# Patient Record
Sex: FEMALE | Race: BLACK OR AFRICAN AMERICAN | NOT HISPANIC OR LATINO | URBAN - METROPOLITAN AREA
[De-identification: names, ages, dates, MRNs, and addresses within clinical notes are randomized per-mention and may not be internally consistent; named-entity substitution may affect disease eponyms.]

---

## 2020-07-01 ENCOUNTER — OFFICE VISIT (OUTPATIENT)
Dept: URBAN - METROPOLITAN AREA CLINIC 92 | Facility: CLINIC | Age: 70
End: 2020-07-01
Payer: MEDICARE

## 2020-07-01 ENCOUNTER — LAB OUTSIDE AN ENCOUNTER (OUTPATIENT)
Dept: URBAN - METROPOLITAN AREA CLINIC 92 | Facility: CLINIC | Age: 70
End: 2020-07-01

## 2020-07-01 DIAGNOSIS — Z86.010 HISTORY OF COLON POLYPS: ICD-10-CM

## 2020-07-01 DIAGNOSIS — B19.20 HCV (HEPATITIS C VIRUS): ICD-10-CM

## 2020-07-01 PROCEDURE — G8938 BMI DOC ONL FUP NT DOC: HCPCS | Performed by: INTERNAL MEDICINE

## 2020-07-01 PROCEDURE — 99214 OFFICE O/P EST MOD 30 MIN: CPT | Performed by: INTERNAL MEDICINE

## 2020-07-01 PROCEDURE — G8427 DOCREV CUR MEDS BY ELIG CLIN: HCPCS | Performed by: INTERNAL MEDICINE

## 2020-07-01 PROCEDURE — 3017F COLORECTAL CA SCREEN DOC REV: CPT | Performed by: INTERNAL MEDICINE

## 2020-07-01 RX ORDER — SODIUM, POTASSIUM,MAG SULFATES 17.5-3.13G
17.5-13.3-1.6 GM/177ML SOLUTION, RECONSTITUTED, ORAL ORAL
Qty: 354 MILLILITER | Refills: 0 | OUTPATIENT
Start: 2020-07-01 | End: 2020-07-02

## 2020-07-01 NOTE — HPI-OTHER HISTORIES
This is a 70-year-old female who presents for evaluation of abdominal pain  She was initially seen for the treatment of chronic HCV.  The patient has a history of IVDA with heroin from 1992 to 2004.  Labs on 05/23/2018 demonstrated elevated liver tests with , , AlkP 134, and TB 0.5.  Her HCV genotype was 1A with HCV RNA of 2 million.  She did not follow up to discuss treatment.  There is a family history of a sister who had colorectal malignancy at age 70 and she has a personal history of colon polyps.  Screening colonoscopy on 06/21/2018 revealed four adenomas and hyperplastic polyps.  The largest was a 1.0 cm tubulovillous adenoma with high-grade dysplasia in the cecum.  There was diverticulosis in the sigmoid colon internal hemorrhoids.  She was recommended to undergo repeat procedure in 6 months but failed to do so.  She states that she became homeless after her initial visit and could not follow up.  She had been drinking up to 2 peers a day but recently quit 1 week ago.  The patient is asymptomatic and denies any abdominal pain, N/V, changes in bowel habits.  There are no symptoms of hematemesis, melena, hematochezia, or constitutional symptoms including unintentional weight loss.  There is no family history of colonic adenoma or colorectal malignancy.

## 2020-07-02 LAB
A/G RATIO: 0.9
ALBUMIN: 4
ALKALINE PHOSPHATASE: 164
ALT (SGPT): 186
AST (SGOT): 165
BILIRUBIN, TOTAL: 0.3
BUN/CREATININE RATIO: 11
BUN: 10
CALCIUM: 9.5
CARBON DIOXIDE, TOTAL: 21
CHLORIDE: 102
CREATININE: 0.89
EGFR IF AFRICN AM: 75
EGFR IF NONAFRICN AM: 65
GLOBULIN, TOTAL: 4.3
GLUCOSE: 98
HEMATOCRIT: 39.2
HEMOGLOBIN: 13.3
INR: 1.1
MCH: 33.9
MCHC: 33.9
MCV: 100
NRBC: (no result)
PLATELETS: 130
POTASSIUM: 4.1
PROTEIN, TOTAL: 8.3
PROTHROMBIN TIME: 11.1
RBC: 3.92
RDW: 11.8
SODIUM: 138
WBC: 5

## 2020-07-06 ENCOUNTER — TELEPHONE ENCOUNTER (OUTPATIENT)
Dept: URBAN - METROPOLITAN AREA CLINIC 92 | Facility: CLINIC | Age: 70
End: 2020-07-06

## 2020-07-24 ENCOUNTER — LAB OUTSIDE AN ENCOUNTER (OUTPATIENT)
Dept: URBAN - METROPOLITAN AREA CLINIC 92 | Facility: CLINIC | Age: 70
End: 2020-07-24

## 2020-07-24 ENCOUNTER — OFFICE VISIT (OUTPATIENT)
Dept: URBAN - METROPOLITAN AREA CLINIC 92 | Facility: CLINIC | Age: 70
End: 2020-07-24

## 2020-07-28 ENCOUNTER — OFFICE VISIT (OUTPATIENT)
Dept: URBAN - METROPOLITAN AREA SURGERY CENTER 16 | Facility: SURGERY CENTER | Age: 70
End: 2020-07-28
Payer: MEDICARE

## 2020-07-28 ENCOUNTER — CLAIMS CREATED FROM THE CLAIM WINDOW (OUTPATIENT)
Dept: URBAN - METROPOLITAN AREA CLINIC 4 | Facility: CLINIC | Age: 70
End: 2020-07-28
Payer: MEDICARE

## 2020-07-28 DIAGNOSIS — D12.5 BENIGN NEOPLASM OF SIGMOID COLON: ICD-10-CM

## 2020-07-28 DIAGNOSIS — D12.2 ADENOMATOUS POLYP OF ASCENDING COLON: ICD-10-CM

## 2020-07-28 DIAGNOSIS — D12.2 BENIGN NEOPLASM OF ASCENDING COLON: ICD-10-CM

## 2020-07-28 DIAGNOSIS — K63.89 OTHER SPECIFIED DISEASES OF INTESTINE: ICD-10-CM

## 2020-07-28 DIAGNOSIS — K63.5 BENIGN COLON POLYP: ICD-10-CM

## 2020-07-28 DIAGNOSIS — Z86.010 H/O ADENOMATOUS POLYP OF COLON: ICD-10-CM

## 2020-07-28 DIAGNOSIS — D12.4 ADENOMATOUS POLYP OF DESCENDING COLON: ICD-10-CM

## 2020-07-28 DIAGNOSIS — K63.89 BACTERIAL OVERGROWTH SYNDROME: ICD-10-CM

## 2020-07-28 DIAGNOSIS — D12.4 BENIGN NEOPLASM OF DESCENDING COLON: ICD-10-CM

## 2020-07-28 PROCEDURE — 88305 TISSUE EXAM BY PATHOLOGIST: CPT | Performed by: PATHOLOGY

## 2020-07-28 PROCEDURE — G8907 PT DOC NO EVENTS ON DISCHARG: HCPCS | Performed by: INTERNAL MEDICINE

## 2020-07-28 PROCEDURE — 45380 COLONOSCOPY AND BIOPSY: CPT | Performed by: INTERNAL MEDICINE

## 2020-07-28 PROCEDURE — 45385 COLONOSCOPY W/LESION REMOVAL: CPT | Performed by: INTERNAL MEDICINE

## 2020-07-28 PROCEDURE — G9936 PMH PLYP/NEO CO/RECT/JUN/ANS: HCPCS | Performed by: INTERNAL MEDICINE

## 2020-08-01 LAB
HCV LOG10: (no result)
HCV LOG10: 7.01
HCV RNA (INTERNATIONAL UNITS): (no result)
HEPATITIS C QUANTITATION: (no result)
TEST INFORMATION:: (no result)

## 2020-08-19 ENCOUNTER — OFFICE VISIT (OUTPATIENT)
Dept: URBAN - METROPOLITAN AREA CLINIC 92 | Facility: CLINIC | Age: 70
End: 2020-08-19

## 2020-08-19 NOTE — HPI-TODAY'S VISIT:
This is a 70-year-old female who presents for evaluation of abdominal pain  She was initially seen for the treatment of chronic HCV.  The patient has a history of IVDA with heroin from 1992 to 2004.  Labs on 05/23/2018 demonstrated elevated liver tests with , , AlkP 134, and TB 0.5.  Her HCV genotype was 1A with HCV RNA of 2 million.  She did not follow up to discuss treatment.  Labs on 07/02/2020 showed elevated liver tests with , , AlkP 164, and TB 0.3.  CBC was normal except thrombocytopenia with Plt 130. INR is 1.1.  HCV RNA was 10,298,200. Abdominal ultrasound on 07/24/2020 showed normal echogenicity with mild hepatomegaly.  There are scattered simple cysts.  There is dilatation of the common bile duct measuring 7 mm without intrahepatic biliary ductal dilatation.    There is a family history of a sister who had colorectal malignancy at age 70 and she has a personal history of colon polyps.  Screening colonoscopy on 06/21/2018 revealed four adenomas and hyperplastic polyps.  The largest was a 1.0 cm tubulovillous adenoma with high-grade dysplasia in the cecum.  There was diverticulosis in the sigmoid colon internal hemorrhoids.  She was recommended to undergo repeat procedure in 6 months but failed to do so.  Colonoscopy on 07/20/2020 showed diverticulosis in the sigmoid colon and internal hemorrhoids.  There were four tubular adenomas, benign mucosal polyp, and hyperplastic polyp.  She states that she became homeless after her initial visit and could not follow up.  She had been drinking up to 2 beers a day but recently quit 1 week ago.  The patient is asymptomatic and denies any abdominal pain, N/V, changes in bowel habits.  There are no symptoms of hematemesis, melena, hematochezia, or constitutional symptoms including unintentional weight loss.  There is no family history of colonic adenoma or colorectal malignancy.

## 2020-09-09 ENCOUNTER — OFFICE VISIT (OUTPATIENT)
Dept: URBAN - METROPOLITAN AREA CLINIC 92 | Facility: CLINIC | Age: 70
End: 2020-09-09

## 2020-09-11 ENCOUNTER — OFFICE VISIT (OUTPATIENT)
Dept: URBAN - METROPOLITAN AREA TELEHEALTH 2 | Facility: TELEHEALTH | Age: 70
End: 2020-09-11

## 2020-10-14 ENCOUNTER — OFFICE VISIT (OUTPATIENT)
Dept: URBAN - METROPOLITAN AREA CLINIC 92 | Facility: CLINIC | Age: 70
End: 2020-10-14
Payer: MEDICARE

## 2020-10-14 VITALS
HEART RATE: 67 BPM | SYSTOLIC BLOOD PRESSURE: 122 MMHG | BODY MASS INDEX: 21.58 KG/M2 | WEIGHT: 126.4 LBS | TEMPERATURE: 94.4 F | HEIGHT: 64 IN | DIASTOLIC BLOOD PRESSURE: 81 MMHG

## 2020-10-14 DIAGNOSIS — Z86.010 HISTORY OF COLON POLYPS: ICD-10-CM

## 2020-10-14 DIAGNOSIS — Z80.0 FAMILY HISTORY OF COLON CANCER: ICD-10-CM

## 2020-10-14 DIAGNOSIS — K57.90 DIVERTICULOSIS: ICD-10-CM

## 2020-10-14 DIAGNOSIS — B17.10 HEPATITIS C: ICD-10-CM

## 2020-10-14 PROBLEM — 312824007: Status: ACTIVE | Noted: 2020-10-14

## 2020-10-14 PROCEDURE — G8482 FLU IMMUNIZE ORDER/ADMIN: HCPCS | Performed by: PHYSICIAN ASSISTANT

## 2020-10-14 PROCEDURE — G8427 DOCREV CUR MEDS BY ELIG CLIN: HCPCS | Performed by: PHYSICIAN ASSISTANT

## 2020-10-14 PROCEDURE — G8420 CALC BMI NORM PARAMETERS: HCPCS | Performed by: PHYSICIAN ASSISTANT

## 2020-10-14 PROCEDURE — 99214 OFFICE O/P EST MOD 30 MIN: CPT | Performed by: PHYSICIAN ASSISTANT

## 2020-10-14 PROCEDURE — 3017F COLORECTAL CA SCREEN DOC REV: CPT | Performed by: PHYSICIAN ASSISTANT

## 2020-10-14 PROCEDURE — G9903 PT SCRN TBCO ID AS NON USER: HCPCS | Performed by: PHYSICIAN ASSISTANT

## 2020-10-14 RX ORDER — GLECAPREVIR AND PIBRENTASVIR 40; 100 MG/1; MG/1
3 TABLETS TABLET, FILM COATED ORAL ONCE A DAY
Qty: 90 TABLET | Refills: 1 | OUTPATIENT
Start: 2020-10-14 | End: 2020-12-12

## 2020-10-14 NOTE — HPI-TODAY'S VISIT:
This is a 70-year-old female who presents for follow-up after a colonoscopy.    She was initially seen for the treatment of chronic HCV.  The patient has a history of IVDA with heroin from 1992 to 2004.  Labs on 05/23/2018 demonstrated elevated liver tests with , , AlkP 134, and TB 0.5.  Her HCV genotype was 1A with HCV RNA of 2 million.  She did not follow up to discuss treatment.   Labs on 07/02/2020 showed elevated liver tests with , , AlkP 164, and TB 0.3.  CBC was normal except thrombocytopenia with Plt 130. INR is 1.1.  HCV RNA was 10,298,200. Abdominal ultrasound on 07/24/2020 showed normal echogenicity with mild hepatomegaly.  There are scattered simple cysts.  There is dilatation of the common bile duct measuring 7 mm without intrahepatic biliary ductal dilatation.    There is a family history of a sister who had colorectal malignancy at age 70 and she has a personal history of colon polyps.  Screening colonoscopy on 06/21/2018 revealed four adenomas and hyperplastic polyps.  The largest was a 1.0 cm tubulovillous adenoma with high-grade dysplasia in the cecum.  There was diverticulosis in the sigmoid colon internal hemorrhoids.  She was recommended to undergo repeat procedure in 6 months but failed to do so.  Colonoscopy on 07/20/2020 showed diverticulosis in the sigmoid colon and internal hemorrhoids.  There were four tubular adenomas, benign mucosal polyp, and hyperplastic polyp.  Colonoscopy 7/28/2020 2mm benign cecal polyp, 3 small adenomas in ascending colon, 3mm hyperplastic rectosigmoid polyp, 5mm descending colon adenoma  She states that she became homeless after her initial visit and could not follow up.  She had been drinking up to 2 beers a day but recently quit 3months ago ago.  The patient is asymptomatic and denies any abdominal pain, N/V, changes in bowel habits.  There are no symptoms of hematemesis, melena, hematochezia, or constitutional symptoms including unintentional weight loss.  There is no family history of colonic adenoma or colorectal malignancy.  She wants to start therapy for HCV

## 2020-11-12 ENCOUNTER — OFFICE VISIT (OUTPATIENT)
Dept: URBAN - METROPOLITAN AREA CLINIC 92 | Facility: CLINIC | Age: 70
End: 2020-11-12

## 2020-11-12 RX ORDER — GLECAPREVIR AND PIBRENTASVIR 40; 100 MG/1; MG/1
3 TABLETS TABLET, FILM COATED ORAL ONCE A DAY
Qty: 90 TABLET | Refills: 1 | OUTPATIENT

## 2020-11-12 RX ORDER — GLECAPREVIR AND PIBRENTASVIR 40; 100 MG/1; MG/1
3 TABLETS TABLET, FILM COATED ORAL ONCE A DAY
Qty: 90 TABLET | Refills: 1 | COMMUNITY
Start: 2020-10-14 | End: 2020-12-12

## 2020-11-12 NOTE — HPI-TODAY'S VISIT:
This is a 70-year-old female who presents for follow-up after a colonoscopy.     She was initially seen for the treatment of chronic HCV.  The patient has a history of IVDA with heroin from 1992 to 2004.  Labs on 05/23/2018 demonstrated elevated liver tests with , , AlkP 134, and TB 0.5.  Her HCV genotype was 1A with HCV RNA of 2 million.  She did not follow up to discuss treatment.   Labs on 07/02/2020 showed elevated liver tests with , , AlkP 164, and TB 0.3.  CBC was normal except thrombocytopenia with Plt 130. INR is 1.1.  HCV RNA was 10,298,200. Abdominal ultrasound on 07/24/2020 showed normal echogenicity with mild hepatomegaly.  There are scattered simple cysts.  There is dilatation of the common bile duct measuring 7 mm without intrahepatic biliary ductal dilatation.    There is a family history of a sister who had colorectal malignancy at age 70 and she has a personal history of colon polyps.  Screening colonoscopy on 06/21/2018 revealed four adenomas and hyperplastic polyps.  The largest was a 1.0 cm tubulovillous adenoma with high-grade dysplasia in the cecum.  There was diverticulosis in the sigmoid colon internal hemorrhoids.  She was recommended to undergo repeat procedure in 6 months but failed to do so.  Colonoscopy on 07/20/2020 showed diverticulosis in the sigmoid colon and internal hemorrhoids.  There were four tubular adenomas, benign mucosal polyp, and hyperplastic polyp.  Colonoscopy 7/28/2020 2mm benign cecal polyp, 3 small adenomas in ascending colon, 3mm hyperplastic rectosigmoid polyp, 5mm descending colon adenoma  She states that she became homeless after her initial visit and could not follow up.  She had been drinking up to 2 beers a day but recently quit 3months ago ago.  The patient is asymptomatic and denies any abdominal pain, N/V, changes in bowel habits.  There are no symptoms of hematemesis, melena, hematochezia, or constitutional symptoms including unintentional weight loss.  There is no family history of colonic adenoma or colorectal malignancy.  Rx'd Mayvret last month and tolerating well.

## 2020-12-15 ENCOUNTER — TELEPHONE ENCOUNTER (OUTPATIENT)
Dept: URBAN - METROPOLITAN AREA CLINIC 92 | Facility: CLINIC | Age: 70
End: 2020-12-15

## 2021-04-20 ENCOUNTER — DASHBOARD ENCOUNTERS (OUTPATIENT)
Age: 71
End: 2021-04-20

## 2021-04-21 PROBLEM — 50711007 HEPATITIS C: Status: ACTIVE | Noted: 2020-10-14

## 2021-04-21 PROBLEM — 398050005 DIVERTICULAR DISEASE OF COLON: Status: ACTIVE | Noted: 2020-10-14

## 2021-04-21 PROBLEM — 428283002 HISTORY OF POLYP OF COLON: Status: ACTIVE | Noted: 2020-10-14

## 2021-04-22 ENCOUNTER — OFFICE VISIT (OUTPATIENT)
Dept: URBAN - METROPOLITAN AREA CLINIC 92 | Facility: CLINIC | Age: 71
End: 2021-04-22

## 2021-04-22 VITALS — HEIGHT: 64 IN

## 2021-04-22 RX ORDER — GLECAPREVIR AND PIBRENTASVIR 40; 100 MG/1; MG/1
3 TABLETS TABLET, FILM COATED ORAL ONCE A DAY
Qty: 90 TABLET | Refills: 1 | Status: ACTIVE | COMMUNITY

## 2021-04-22 RX ORDER — GLECAPREVIR AND PIBRENTASVIR 40; 100 MG/1; MG/1
3 TABLETS TABLET, FILM COATED ORAL ONCE A DAY
Qty: 90 TABLET | Refills: 1 | OUTPATIENT

## 2021-04-22 NOTE — HPI-TODAY'S VISIT:
This is a 70-year-old female who presents for follow-up.     She was initially seen for the treatment of chronic HCV.  The patient has a history of IVDA with heroin from 1992 to 2004.  Labs on 05/23/2018 demonstrated elevated liver tests with , , AlkP 134, and TB 0.5.  Her HCV genotype was 1A with HCV RNA of 2 million.  She did not follow up to discuss treatment.   Labs on 07/02/2020 showed elevated liver tests with , , AlkP 164, and TB 0.3.  CBC was normal except thrombocytopenia with Plt 130. INR is 1.1.  HCV RNA was 10,298,200. Abdominal ultrasound on 07/24/2020 showed normal echogenicity with mild hepatomegaly.  There are scattered simple cysts.  There is dilatation of the common bile duct measuring 7 mm without intrahepatic biliary ductal dilatation.    There is a family history of a sister who had colorectal malignancy at age 70 and she has a personal history of colon polyps.  Screening colonoscopy on 06/21/2018 revealed four adenomas and hyperplastic polyps.  The largest was a 1.0 cm tubulovillous adenoma with high-grade dysplasia in the cecum.  There was diverticulosis in the sigmoid colon internal hemorrhoids.  She was recommended to undergo repeat procedure in 6 months but failed to do so.  Colonoscopy on 07/20/2020 showed diverticulosis in the sigmoid colon and internal hemorrhoids.  There were four tubular adenomas, benign mucosal polyp, and hyperplastic polyp. Colonoscopy 7/28/2020 2mm benign cecal polyp, 3 small adenomas in ascending colon, 3mm hyperplastic rectosigmoid polyp, 5mm descending colon adenoma  She became homeless after her initial visit and could not follow up.  She stopped alcohol over 6months ago.  The patient is asymptomatic and denies any abdominal pain, N/V, changes in bowel habits.  There are no symptoms of hematemesis, melena, hematochezia, or constitutional symptoms including unintentional weight loss.  There is no family history of colonic adenoma or colorectal malignancy.  Rx'd Mayvret in Sept and has again been lost to follow-up and has not had labs during treatment.

## 2023-08-30 ENCOUNTER — OFFICE VISIT (OUTPATIENT)
Dept: URBAN - METROPOLITAN AREA CLINIC 98 | Facility: CLINIC | Age: 73
End: 2023-08-30

## 2023-08-30 RX ORDER — GLECAPREVIR AND PIBRENTASVIR 40; 100 MG/1; MG/1
3 TABLETS TABLET, FILM COATED ORAL ONCE A DAY
Qty: 90 TABLET | Refills: 1 | Status: ACTIVE | COMMUNITY

## 2023-08-30 NOTE — HPI-TODAY'S VISIT:
Ms. Adams is a 72 yo F presenting for followup visit  for colonoscopy.  10/14/2020 by Sandy Mckinney/Dr. Harper for HCV - Mavyret prescribed for 8 weeks.  Patient here for CRC screening.   Prior colonoscopy:   Family history of colon cancer or advanced polyps:   Blood in stools:   Frequency of bowel movements:   Abdominal surgeries:  I reviewed:  7/28/20 colonoscopy- 6 polyps removed, diverticulosis, hemorrhoids 7/28/20 colon path: hyperplastic, tubular adenomas,benign mucosal polyps, next due in 2023

## 2024-05-22 ENCOUNTER — OFFICE VISIT (OUTPATIENT)
Dept: URBAN - METROPOLITAN AREA CLINIC 92 | Facility: CLINIC | Age: 74
End: 2024-05-22